# Patient Record
Sex: MALE | Race: WHITE | Employment: OTHER | URBAN - METROPOLITAN AREA
[De-identification: names, ages, dates, MRNs, and addresses within clinical notes are randomized per-mention and may not be internally consistent; named-entity substitution may affect disease eponyms.]

---

## 2017-11-14 NOTE — TELEPHONE ENCOUNTER
bromfenac  Last Written Prescription Date: unknown  Last Fill Quantity: unknown,   # refills: unknown  Last Office Visit : 5/9/14  Future Office visit:  None    Office Visit     5/9/2014  [facility]    Irma Lucero MD   Ophthalmology    Senile nuclear sclerosis, bilateral +3 more   Dx    Follow Up For ; Referred by Self, Referred, MD   Reason for visit    Progress Notes      I have confirmed the patient's Past Medical History, Past Surgical History, Social History, Family History, Problem List, Medication List and Technician note.     Retinal Imaging  OCT 5/9/2014  RE: within normal limits   LE: mild ERM     ASSESSMENT/PLAN  Ryan Lomax is a 73 year old male with the following ophthalmologic problems:             1. 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   2. Macular puckering of retina, left  - s/p PPV 07/13 with Membrane peel/ERM+ILM  - mild recurrence stable, much improved from before   3. PVD (posterior vitreous detachment), both eyes   - Retinal detachment/retinal tear precautions discussed with patient  Contact clinic immediately for new floaters/flashers or shadows in vision   4. 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 Given asymmetry  - rec eval with HVF/rNFL OCT  - IOP ok today  - to be done a PN by [place] next 2-4 months                F/u at PN 1 year OCT            Routing refill request to provider for review/approval because:  Bromfenac not on med list.  last seen 5/9/14. rf?    Med refused. Pharmacy was called.

## 2017-11-15 RX ORDER — BROMFENAC 1.03 MG/ML
1 SOLUTION/ DROPS OPHTHALMIC DAILY
Qty: 1.7 ML | OUTPATIENT
Start: 2017-11-15

## 2020-06-05 RX ORDER — KETOROLAC TROMETHAMINE 5 MG/ML
SOLUTION OPHTHALMIC
Qty: 5 ML | OUTPATIENT
Start: 2020-06-05

## 2021-09-03 RX ORDER — KETOROLAC TROMETHAMINE 5 MG/ML
SOLUTION OPHTHALMIC
Qty: 5 ML | OUTPATIENT
Start: 2021-09-03

## 2021-09-07 NOTE — TELEPHONE ENCOUNTER
KETOROLAC 0.5% OPHTH SOLN 5ML    Requested directions:  Place 1 drop Into the left eye 2 times daily - Left Eye  Current directions on the medication list:  Place 1 drop Into the left eye 2 times daily - Left Eye    Last Written Prescription Date:   1/16/2014  Last Fill Quantity: 3,   # refills: 0    Last Office Visit:  5/9/2014  Future Office visit:  None    Attending Provider:     Irma Lucero MD  Ophthalmology       Last Clinic Note:  5/9/2014    ASSESSMENT/PLAN  Ryan Lomax is a 73 year old male with the following ophthalmologic problems:             1. 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      2. Macular puckering of retina, left  - s/p PPV 07/13 with Membrane peel/ERM+ILM  - mild recurrence stable, much improved from before      3. PVD (posterior vitreous detachment), both eyes   - Retinal detachment/retinal tear precautions discussed with patient  Contact clinic immediately for new floaters/flashers or shadows in vision      4. 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 Given asymmetry  - rec eval with HVF/rNFL OCT  - IOP ok today  - to be done a PN by [place] next 2-4 months                   F/u at PN 1 year OCT    Routing refill request to provider for review/approval because:  Second Request       Over due office visit  Please call Pt to schedule for updated office visit for refills for Pt care.    Thank you

## 2021-09-08 ENCOUNTER — TELEPHONE (OUTPATIENT)
Dept: OPHTHALMOLOGY | Facility: CLINIC | Age: 81
End: 2021-09-08

## 2021-09-08 RX ORDER — KETOROLAC TROMETHAMINE 5 MG/ML
SOLUTION OPHTHALMIC
Qty: 5 ML | OUTPATIENT
Start: 2021-09-08

## 2021-09-09 RX ORDER — KETOROLAC TROMETHAMINE 5 MG/ML
SOLUTION OPHTHALMIC
Qty: 5 ML | OUTPATIENT
Start: 2021-09-09

## 2022-08-15 NOTE — TELEPHONE ENCOUNTER
Tried calling Don, but his number is not active     Yarely Espinal Communication Facilitator on 8/15/2022 at 1:05 PM

## 2022-08-15 NOTE — TELEPHONE ENCOUNTER
ketorolac (ACULAR) 0.5 % ophthalmic solution  Last Written Prescription Date:   1/16/2014  Last Fill Quantity: 3,   # refills: 0  Last Office Visit :  5/9/2014  Future Office visit:  None     Irma Lucero MD    Ophthalmology     ASSESSMENT/PLAN  Ryan Lomax is a 73 year old male with the following ophthalmologic problems:             1. 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      2. Macular puckering of retina, left  - s/p PPV 07/13 with Membrane peel/ERM+ILM  - mild recurrence stable, much improved from before      3. PVD (posterior vitreous detachment), both eyes   - Retinal detachment/retinal tear precautions discussed with patient  Contact clinic immediately for new floaters/flashers or shadows in vision      4. 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 Given asymmetry  - rec eval with HVF/rNFL OCT  - IOP ok today  - to be done a PN by [place] next 2-4 months                   F/u at PN 1 year OCT      Routing refill request to provider for review/approval because:  Several request  Over due office visit  Please call Pt to schedule.       Suzie Mcclendon RN  Central Triage Red Flags/Med Refills

## 2022-08-16 RX ORDER — KETOROLAC TROMETHAMINE 5 MG/ML
1 SOLUTION OPHTHALMIC 2 TIMES DAILY
Qty: 3 ML | Refills: 0 | OUTPATIENT
Start: 2022-08-16